# Patient Record
(demographics unavailable — no encounter records)

---

## 2024-11-22 NOTE — PHYSICAL EXAM
[General Appearance - Alert] : alert [General Appearance - In No Acute Distress] : in no acute distress [General Appearance - Well Nourished] : well nourished [General Appearance - Well Developed] : well developed [Sclera] : the sclera and conjunctiva were normal [Extraocular Movements] : extraocular movements were intact [Outer Ear] : the ears and nose were normal in appearance [Hearing Threshold Finger Rub Not [place]] : hearing was normal [Nasal Cavity] : the nasal mucosa and septum were normal [Neck Appearance] : the appearance of the neck was normal [Neck Cervical Mass (___cm)] : no neck mass was observed [Respiration, Rhythm And Depth] : normal respiratory rhythm and effort [Exaggerated Use Of Accessory Muscles For Inspiration] : no accessory muscle use [Auscultation Breath Sounds / Voice Sounds] : lungs were clear to auscultation bilaterally [Heart Sounds] : normal S1 and S2 [Heart Sounds Gallop] : no gallops [Murmurs] : no murmurs [Edema] : there was no peripheral edema [Bowel Sounds] : normal bowel sounds [Abdomen Soft] : soft [Abdomen Tenderness] : non-tender [Cervical Lymph Nodes Enlarged Posterior Bilaterally] : posterior cervical [Cervical Lymph Nodes Enlarged Anterior Bilaterally] : anterior cervical [Supraclavicular Lymph Nodes Enlarged Bilaterally] : supraclavicular [Abnormal Walk] : normal gait [Nail Clubbing] : no clubbing  or cyanosis of the fingernails [Musculoskeletal - Swelling] : no joint swelling seen [Skin Color & Pigmentation] : normal skin color and pigmentation [Skin Turgor] : normal skin turgor [] : no rash [Cranial Nerves] : cranial nerves 2-12 were intact [No Focal Deficits] : no focal deficits [Oriented To Time, Place, And Person] : oriented to person, place, and time [Impaired Insight] : insight and judgment were intact [Affect] : the affect was normal [FreeTextEntry1] : RLQ renal transplant scar

## 2024-11-22 NOTE — HISTORY OF PRESENT ILLNESS
[FreeTextEntry1] : 26-year-old male with PMH of live donor kidney transplant (7/26/21 at [facility] with Dr. 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 He was not on dialysis before his first transplant. Currently he is on dialysis and his nephrologist is Dr. Young. He report occasionally missing meds with his first kidney. Had several rejections but no medication intolerances and no significant infections.  He has a history of HTN (diagnosed in 2015), insulin-dependent T2DM (diagnosed after transplant in late 2021), and anemia. He was last admitted to the hospital on 8/2/23 from [facility] to [facility] with abdominal pain and vomiting for one day and acute on chronic renal failure concerning for renal transplant rejection. Pt started dialysis that admission. 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 He was treated with pulse dose steroids. He also developed Covid while in the hospital treated with remdesivir.  He currently remains on immunosuppression and on dialysis. On tacro 2.5mgs BID.  Recent hospitalizations:  March 2024 he was on a plane and then developed a PE.  He is now on Eliquis for 6 months.  Also initially had hospitalizations for fluid overload and once for vomiting and required an endoscopy for duodenitis.  Now improved with protonix.   Follows with Dr. Abraham at [facility] Last Seen 10/2023 Listed 1/2024 Dialysis 8/2023 ABO O will check titer today PRA 79 % will repeat today  Most recent testing Cardiac - Dr. Metz ECHO 12/11/2023 Bobby Brannon had a transthoracic echocardiogram which showed normal left ventricular systolic function, no valvular abnormalities, normal pulmonary pressures. When compared to the prior echocardiogram of 5/3/2023, there have been no significant interval changes.  Radiology Chest Xray 10/2024- normal chest radiograph CT abd and pelvis 3/11/2024- 1. Featureless right lower quadrant renal transplant with moderate perinephric inflammatory stranding; differential considerations may include infection, rejection, or infarct. 2. Suspected developing pulmonary edema with small right and trace left pleural effusions. 3. Indeterminate groundglass nodules within the lung bases may represent evidence of superimposed infection.  Cancer Screening PSA 10/2023- 1.06 Colonoscopy N/A

## 2024-11-22 NOTE — CONSULT LETTER
[Dear  ___] : Dear  [unfilled], [Courtesy Letter:] : I had the pleasure of seeing your patient, [unfilled], in my office today. [Please see my note below.] : Please see my note below. [Consult Closing:] : Thank you very much for allowing me to participate in the care of this patient.  If you have any questions, please do not hesitate to contact me. [Sincerely,] : Sincerely, [FreeTextEntry3] : Luis Up, DO

## 2024-11-22 NOTE — PLAN
[FreeTextEntry1] : 1.  ESRD - failed first transplant related to multiple rejections.  Currently on dialysis and relisted for kidney transplant.  2.  Kidney transplant - would continue tacro to avoid further sensitization.   3.  DM2 - check A1c.  Developed post transplant.  No longer on insulin, controlled on prandin 4.  PE - needs hematology evaluation.  Currently on Eliquis 5.  CV - cardiac testing up to date.   I have personally discussed the risks and benefits of transplantation and patient attended transplant education class where the following was disclosed:   Reviewed factors affecting survival and morbidity while on dialysis, the transplant wait list and reviewed pebbles-operative and long-term risk factors affecting outcome in kidney transplantation.     One year SRTR outcomes for national and [facility] were discussed in regards to patient survival and graft survival after transplantation.     Details of transplant surgery, including complications were discussed. Immunosuppression and complications including infection including life threatening sepsis and opportunistic infections, malignancy and new onset diabetes were discussed.     Benefits of live donor transplantation as well as variability in wait times across regions and multiple listing were discussed.  KDPI >85% and PHS high risk criteria donors were discussed.  HCV kidney transplantation was discussed.   Will proceed with completing/ updating work up and listing for transplant/ live donor transplant once work up is reviewed and found to be acceptable by multidisciplinary listing committee.

## 2025-01-06 NOTE — ASSESSMENT
[FreeTextEntry1] : Headache, body ache, chills, vomiting Rapid flu and COVID negative PCR for COVID sent Prescribe Zofran as needed for vomiting. Renewed gabapentin 100 mg for body ache.  Insomnia Patient sleeping better with trazodone 100 mg. renewed prescription.  Stage IV kidney disease diabetes failed kidney transplant: he is going for dialysis 3 days a week He is on transplant list this will be his second transplant follows with nephrology.  Type 2 diabetes, insulin-dependent: He is on Basaglar 11 unit at At bedtime and Humalog As per sliding scale 3 times a day He is under the care of endocrinology.  As per patient last A1c was 6.5.  Hypertension: BP is elevated  today.  He is currently on nifedipine CR 30 mg once a day, losartan 100 mg, labetalol 200 mg twice a day  claims compliance with medication. He is going to have dialysis this afternoon.  Heart failure with preserved ejection fraction: seen cardiology  Hyperlipidemia/: On simvastatin 20 mg once a day.  GERD: Symptoms are controlled on pantoprazole 40 mg once a day.  Takes famotidine 20 mg after dinner. Renewed prescription.  Migraine HA controlled, not having any more HA.

## 2025-01-06 NOTE — REVIEW OF SYSTEMS
[Chills] : chills [Fatigue] : fatigue [Nausea] : nausea [Vomiting] : vomiting [Headache] : headache [Fever] : no fever [Hot Flashes] : no hot flashes [Night Sweats] : no night sweats [Recent Change In Weight] : ~T no recent weight change [Discharge] : no discharge [Pain] : no pain [Vision Problems] : no vision problems [Itching] : no itching [Earache] : no earache [Hearing Loss] : no hearing loss [Nosebleeds] : no nosebleeds [Postnasal Drip] : no postnasal drip [Nasal Discharge] : no nasal discharge [Sore Throat] : no sore throat [Hoarseness] : no hoarseness [Shortness Of Breath] : no shortness of breath [Wheezing] : no wheezing [Cough] : no cough [Dyspnea on Exertion] : not dyspnea on exertion

## 2025-01-06 NOTE — HISTORY OF PRESENT ILLNESS
[de-identified] : Mr. ASH MELVIN is here for wellness exam Complain of pain in the right lower back 1 spot in the right lower back is tender to touch. Patient stated he is has been playing softball for last 6 months. Migraine headache is better controlled not getting that frequent headache anymore.  His blood pressure is elevated he is due for dialysis today.  He is not urinating that much he urinates once in every 3 days. Complains of muscle cramp every time he gets dialysis. Tried trazodone 50 mg to help him sleep patient stated it is helping him to sleep much better.  He is taking as needed other days he is smoking weed. Seen cardiology workup normal.  No hospital admissions since his last visit. 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 sees dandre for type 2 DM   [FreeTextEntry8] : Mr. ASH MELVIN is here with a c/o HA, body ache , chills , vomiting for last few days no cough , sinus congestion , fever. sleeping better on trazodone 100 mg , needs refill stated gabapentin was helping for body ache, he ran out of prescription. He had dialysis yesterday, he is going to have also today. . 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 sees dandre for type 2 DM

## 2025-01-06 NOTE — HISTORY OF PRESENT ILLNESS
[de-identified] : Mr. ASH MELVIN is here for wellness exam Complain of pain in the right lower back 1 spot in the right lower back is tender to touch. Patient stated he is has been playing softball for last 6 months. Migraine headache is better controlled not getting that frequent headache anymore.  His blood pressure is elevated he is due for dialysis today.  He is not urinating that much he urinates once in every 3 days. Complains of muscle cramp every time he gets dialysis. Tried trazodone 50 mg to help him sleep patient stated it is helping him to sleep much better.  He is taking as needed other days he is smoking weed. Seen cardiology workup normal.  No hospital admissions since his last visit. 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 sees dandre for type 2 DM   [FreeTextEntry8] : Mr. ASH MELVIN is here with a c/o HA, body ache , chills , vomiting for last few days no cough , sinus congestion , fever. sleeping better on trazodone 100 mg , needs refill stated gabapentin was helping for body ache, he ran out of prescription. He had dialysis yesterday, he is going to have also today. . 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 sees dandre for type 2 DM

## 2025-01-16 NOTE — HISTORY OF PRESENT ILLNESS
[de-identified] : The patient is a 27 year old male with PMHx of ESRD on hemodialysis s/p failed renal transplant 2021, HTN, HLD, T2DM on insulin, and anemia presenting for hematology evaluation for anemia and history of PE.   Patient was initially admitted to hospital in March of 2024 for nasuea/vomitng, diagnosed with duodenitis, Spent 1 week admitted to [facility], recovered, and then shortly after his admission he travelled to [place] by plane (~2 hrs). When patient returned from [place], he was having shortness of breath. Patient went to the hospital and on 3/12/24 he was found to have a left lower lobe subsegmental pulmonary embolism. Patient discharged on Eliquis anticoagulation.   Patient then presented to hospital at the end of November 2024for ear pain and was found to be anemic (hx of anemia on CBCs).   Given his anemia and hx of PE, the patient was instructed to follow up with a hematologist for comprehensive work up.   ROS is grossly negative.

## 2025-01-16 NOTE — PHYSICAL EXAM
[Fully active, able to carry on all pre-disease performance without restriction] : Status 0 - Fully active, able to carry on all pre-disease performance without restriction [Normal] : affect appropriate [de-identified] : grossly intact [de-identified] : no LAD or masses [de-identified] : no calf tenderness, no venous stasis changes, +HD dialysis

## 2025-01-16 NOTE — ASSESSMENT
[FreeTextEntry1] : The patient is a 27 year old male with PMHx of ESRD on hemodialysis s/p failed renal transplant 2021, HTN, HLD, T2DM on insulin, and anemia presenting for hematology evaluation for anemia and history of PE.   Pulmonary Embolism - 3/12/24 CTA Chest showed Left lower lobe subsegmental pulmonary embolism. No evidence of right heart strain. Mild pulmonary edema. Bilateral lower lobe opacities most likely representing atelectasis. Small bilateral pleural effusions. - Unlikely provoked as patient only was on a 2 hour flight from [place] to [place]. Patient is adopted, unknown in there is a family history of thrombophilias/hematologic disorders.  - 6/3/2024 CTA Chest showed No evidence of pulmonary embolus. Pulmonary edema. Trace right pleural fluid and trace pericardial fluid. - Given the patient is adopted and has no information on his family history, will do a hypercoagulability work up: anticardiolipin IgG, anticardiolipin IgM, antithrombin III assay, beta 2 glycoprotein 1 IgG Ab, beta 2 glycoprotein 1 IgM Ab, diluted jessica's viper venom time, factor V leiden , protein C activity/antigen with reflex, protein S free activity assay, protein s antigen assay, silica clotting time.  - Continue on Eliquis BID for now. If the workup is negative, will stop Eliquis (has been on since March of 2024).    Anemia - 1/16/2025 Hgb 11.0  - Likely in the setting of ESRD/dialysis  - Recent procedure HD fistula done at renal [facility] in [place] by vascular Dr. Mustafa